# Patient Record
Sex: FEMALE | Race: BLACK OR AFRICAN AMERICAN | URBAN - METROPOLITAN AREA
[De-identification: names, ages, dates, MRNs, and addresses within clinical notes are randomized per-mention and may not be internally consistent; named-entity substitution may affect disease eponyms.]

---

## 2021-07-21 ENCOUNTER — CLAIMS CREATED FROM THE CLAIM WINDOW (OUTPATIENT)
Dept: URBAN - METROPOLITAN AREA CLINIC 118 | Facility: CLINIC | Age: 51
End: 2021-07-21
Payer: COMMERCIAL

## 2021-07-21 ENCOUNTER — DASHBOARD ENCOUNTERS (OUTPATIENT)
Age: 51
End: 2021-07-21

## 2021-07-21 ENCOUNTER — LAB OUTSIDE AN ENCOUNTER (OUTPATIENT)
Dept: URBAN - METROPOLITAN AREA CLINIC 118 | Facility: CLINIC | Age: 51
End: 2021-07-21

## 2021-07-21 DIAGNOSIS — Z12.11 COLON CANCER SCREENING: ICD-10-CM

## 2021-07-21 DIAGNOSIS — R07.9 CHEST PAIN, UNSPECIFIED TYPE: ICD-10-CM

## 2021-07-21 DIAGNOSIS — K21.9 GASTROESOPHAGEAL REFLUX DISEASE, UNSPECIFIED WHETHER ESOPHAGITIS PRESENT: ICD-10-CM

## 2021-07-21 PROBLEM — 235595009: Status: ACTIVE | Noted: 2021-07-21

## 2021-07-21 PROCEDURE — 99204 OFFICE O/P NEW MOD 45 MIN: CPT | Performed by: INTERNAL MEDICINE

## 2021-07-21 NOTE — HPI-TODAY'S VISIT:
50 yo BF here for evaluation of an episode of significant bloating and fullness in the chest awaking her at night, with acid regurgitation, lasting 2-3 days.  Typically, she has similar attacks occurring 2-3x/yr for over 20 yrs, usu lasting 1/2 day.  Symptoms may be precipitated by stress.  Sleeps poorly.  No abd pain.  Pt on daily Nexium x 20 yrs.   No dysphagia.  BMs 2-3x/d, no change.  No GI bleed.  No weight loss.

## 2021-07-28 PROBLEM — 305058001: Status: ACTIVE | Noted: 2021-07-21

## 2021-07-28 PROBLEM — 29857009: Status: ACTIVE | Noted: 2021-07-21

## 2021-08-16 ENCOUNTER — OFFICE VISIT (OUTPATIENT)
Dept: URBAN - METROPOLITAN AREA SURGERY CENTER 23 | Facility: SURGERY CENTER | Age: 51
End: 2021-08-16